# Patient Record
(demographics unavailable — no encounter records)

---

## 2024-11-15 NOTE — PHYSICAL EXAM
[Chaperone Present] : A chaperone was present in the examining room during all aspects of the physical examination [39621] : A chaperone was present during the pelvic exam. [Labia Majora] : normal [Labia Minora] : normal [Normal] : normal [Uterine Adnexae] : normal [FreeTextEntry2] : Linette FNP-BC [FreeTextEntry6] : left adnexal fullness

## 2024-11-15 NOTE — PLAN
[FreeTextEntry1] : CBC/Ca 125 obtained Pelvic MRI ordered  discussed in detail treatment for dermoid cyst she would be a great candidate for RASS left cystectomy Risks and benefits discussed in detail. see scanned in image patient to follow up for final decision for surgery pending pre op sono all of her questions regarding procedure were answered she is agreeable with plan   I Anna Sebastian [facility] am scribing for the presence of Dr. Laguerre the following sections HISTORY OF PRESENT ILLNESS, PAST MEDICAL/FAMILY/SOCIAL HISTORY; REVIEW OF SYSTEMS; VITAL SIGNS; PHYSICAL EXAM; DISPOSITION. I personally performed the services described in the documentation, reviewed the documentation recorded by the scribe in my presence and it accurately and completely records my words and actions.

## 2024-12-09 NOTE — PHYSICAL EXAM
[Appropriately responsive] : appropriately responsive [Alert] : alert [Oriented x3] : oriented x3 [Labia Majora] : normal [Labia Minora] : normal [Normal] : normal [Uterine Adnexae] : normal

## 2024-12-10 NOTE — HISTORY OF PRESENT ILLNESS
[FreeTextEntry1] : Patient is a 30 yo female here today for consult for surgery.   MRI done which revealed 4 cm left ovarian solid mass O-RADS 4, intermediate risk. It is not compatible with a dermoid cyst.   NORMAL CBC NORMAL

## 2024-12-23 NOTE — PLAN
[FreeTextEntry1] : plan LESS Left oophorectomy  zpack and oxycodone #4 ordered  Discussed pre op and post op instructions in detail. Vaginal restrictions only for 2 weeks. all of patients questions regarding procedure were answered. consent signed by MD, patient and witness. she is to f/u with me 2 weeks post operatively. she is agreeable with plan.   I Anna Sebastian [facility] am scribing for the presence of Dr. Laguerre the following sections HISTORY OF PRESENT ILLNESS, PAST MEDICAL/FAMILY/SOCIAL HISTORY; REVIEW OF SYSTEMS; VITAL SIGNS; PHYSICAL EXAM; DISPOSITION. I personally performed the services described in the documentation, reviewed the documentation recorded by the scribe in my presence and it accurately and completely records my words and actions.

## 2024-12-23 NOTE — PLAN
[FreeTextEntry1] : plan LESS Left oophorectomy  zpack and oxycodone #4 ordered  Discussed pre op and post op instructions in detail. Vaginal restrictions only for 2 weeks. all of patients questions regarding procedure were answered. consent signed by MD, patient and witness. she is to f/u with me 2 weeks post operatively. she is agreeable with plan.   I Anna Sebastian [facility] am scribing for the presence of Dr. Laguerre the following sections HISTORY OF PRESENT ILLNESS, PAST MEDICAL/FAMILY/SOCIAL HISTORY; REVIEW OF SYSTEMS; VITAL SIGNS; PHYSICAL EXAM; DISPOSITION. I personally performed the services described in the documentation, reviewed the documentation recorded by the scribe in my presence and it accurately and completely records my words and actions.

## 2024-12-23 NOTE — PHYSICAL EXAM
[Chaperone Present] : A chaperone was present in the examining room during all aspects of the physical examination [[id number]] : A chaperone was present during the pelvic exam. [Appropriately responsive] : appropriately responsive [Alert] : alert [No Acute Distress] : no acute distress [No Lymphadenopathy] : no lymphadenopathy [Regular Rate Rhythm] : regular rate rhythm [No Murmurs] : no murmurs [Clear to Auscultation B/L] : clear to auscultation bilaterally [Soft] : soft [Non-tender] : non-tender [Non-distended] : non-distended [No HSM] : No HSM [No Lesions] : no lesions [No Mass] : no mass [Oriented x3] : oriented x3 [Labia Majora] : normal [Labia Minora] : normal [Normal] : normal [Uterine Adnexae] : normal [FreeTextEntry2] : Linette FNP-BC

## 2024-12-23 NOTE — PHYSICAL EXAM
[Chaperone Present] : A chaperone was present in the examining room during all aspects of the physical examination [[id number]] : A chaperone was present during the pelvic exam. [Appropriately responsive] : appropriately responsive [Alert] : alert [No Acute Distress] : no acute distress [No Lymphadenopathy] : no lymphadenopathy [Regular Rate Rhythm] : regular rate rhythm [No Murmurs] : no murmurs [Clear to Auscultation B/L] : clear to auscultation bilaterally [Soft] : soft [Non-tender] : non-tender [Non-distended] : non-distended [No HSM] : No HSM [No Lesions] : no lesions [No Mass] : no mass [Oriented x3] : oriented x3 [Labia Majora] : normal [Labia Minora] : normal [Normal] : normal [Uterine Adnexae] : normal [FreeTextEntry2] : Linette FNP-BC

## 2025-01-14 NOTE — HISTORY OF PRESENT ILLNESS
[Pain is well-controlled] : pain is well-controlled [Clean/Dry/Intact] : clean, dry and intact [None] : no vaginal bleeding [Normal] : normal [Pathology reviewed] : pathology reviewed [Fever] : no fever [Chills] : no chills [Nausea] : no nausea [Vomiting] : no vomiting [Erythema] : not erythematous [de-identified] : Patient is a 31 yo female here today post op s/p LESS Left oophorectomy 12/27/24. 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

## 2025-01-14 NOTE — PLAN
[None] : None [FreeTextEntry1] : will send to GYN/ONC  Pt to call with heavy bleeding or pain not controlled with NSAIDs Pathology and Surgical photos reviewed today Pt to f/u in 4 weeks for final post op visit. Supportive care measure discussed. All questions answered, pt agreeable with plan.    I Anna Sebastian [facility] am scribing for the presence of Dr. Laguerre the following sections HISTORY OF PRESENT ILLNESS, PAST MEDICAL/FAMILY/SOCIAL HISTORY; REVIEW OF SYSTEMS; VITAL SIGNS; PHYSICAL EXAM; DISPOSITION. I personally performed the services described in the documentation, reviewed the documentation recorded by the scribe in my presence and it accurately and completely records my words and actions.

## 2025-01-14 NOTE — HISTORY OF PRESENT ILLNESS
[Pain is well-controlled] : pain is well-controlled [Clean/Dry/Intact] : clean, dry and intact [None] : no vaginal bleeding [Normal] : normal [Pathology reviewed] : pathology reviewed [Fever] : no fever [Chills] : no chills [Nausea] : no nausea [Vomiting] : no vomiting [Erythema] : not erythematous [de-identified] : Patient is a 31 yo female here today post op s/p LESS Left oophorectomy 12/27/24. 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

## 2025-01-14 NOTE — PLAN
[None] : None [FreeTextEntry1] : will send to GYN/ONC  Pt to call with heavy bleeding or pain not controlled with NSAIDs Pathology and Surgical photos reviewed today Pt to f/u in 4 weeks for final post op visit. Supportive care measure discussed. All questions answered, pt agreeable with plan.    I Anna Sebastian [facility] am scribing for the presence of Dr. Laguerre the following sections HISTORY OF PRESENT ILLNESS, PAST MEDICAL/FAMILY/SOCIAL HISTORY; REVIEW OF SYSTEMS; VITAL SIGNS; PHYSICAL EXAM; DISPOSITION. I personally performed the services described in the documentation, reviewed the documentation recorded by the scribe in my presence and it accurately and completely records my words and actions.

## 2025-01-30 NOTE — ASSESSMENT
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   Diagnosis of borderline tumor is a diagnosis of a precancerous condition with low risk for recurrence. Due to micropapillary features of her tumor, her recurrence rate is less than or equal to 10%. In review of her MRI imaging, her upper abdomen appears normal and therefore the need for additional imaging at this time is not present. I would like to offer surveillance for her but will confirm with our treatment planning board at the upcoming conference on 2/4/25. I will call patient either Tuesday afternoon or Wednesday to advise her of final recommendations. Should we go forward with surveillance, patient will have US in 4 months as well as CA-125. In terms of trying to conceive, patient is interested in trying to plan for a family now, I advised her to hold at least 6 months to ensure right ovary remains normal before TTC, if normal will provide clearance at that time.

## 2025-01-30 NOTE — CHIEF COMPLAINT
[Spouse] : spouse [FreeTextEntry1] : [facility] Physician [facility] [place] [facility] [address] [place] [id number]  Serous borderline tumor

## 2025-01-30 NOTE — HISTORY OF PRESENT ILLNESS
[FreeTextEntry1] : 31 yo G0 LMP 1/20/25 referred by Dr Laguerre for left ovarian micropapillary serous borderline tumor.   Patient is an US and began feeling LLQ pain, she performed US for herself and saw a mass on her left ovary. She ultimately followed up with her gynecologist at [facility] who did an US in June and was advised to follow up in 4 months. At her 4 month follow up she states cyst had not changed but surgical consultation was an option as well as observation. Patient scheduled consultation with surgeon but felt uneasy about this and ultimately saw Dr Laguerre 11/15/24 who ordered tumor makers and MRI.    11/15/24 (18) MR 11/22/24 4 cm left ovarian solid mass O-RADS 4, intermediate risk. It is not compatible with a dermoid cyst. No lymphadenopathy or ascites.  US 12/20/24 3.1 x 3.2 x 3.3 cm solid vascular mass on the left ovary.   Patient s/p EUA laparoendoscopic single site left salpingo-oophorectomy 12/27/24, pathology resulted as Left adnexa -ovary with micropapillary serous borderline tumor, tumor shows abundant confluent growth and some pathologist may classify this as non-invasive micropapillary low grade serous carcinoma. Since the tumor is non-invasive it is best classified as micropapillary serous borderline tumor and does not represent an invasive carcinoma. [facility] stains show the tumor cells to be positive for WT1 and ER while negative for GATA3 and TTF1. PW-negative for malignant cysts, cystospin preparation shows rare benign mesothelial cells.   Lpap: 10/2024 WNL per pt never abnormal

## 2025-01-30 NOTE — PLAN
[TextEntry] : CA-125 and US in 4 months Will present at [facility], I will call after to discuss consensus Hold TTC for 6 months

## 2025-01-30 NOTE — END OF VISIT
[FreeTextEntry3] : I, Dr. Susy Banda, personally performed the evaluation and management of (E/M) services for this new patient. That E/M includes conducting the initial examination, assessing all conditions, and establishing the plan of care. Today, Angelina Zamorano PA-C, was here to observe my evaluation and management services for this patient to be followed going forward.

## 2025-02-11 NOTE — REASON FOR VISIT
[Post Op Day: ___] : Post-Op Day:  #[unfilled] Photo Preface (Leave Blank If You Do Not Want): Photographs were obtained today for monitoring. Detail Level: Simple

## 2025-02-11 NOTE — HISTORY OF PRESENT ILLNESS
[Pain is well-controlled] : pain is well-controlled [Fever] : no fever [Chills] : no chills [Nausea] : no nausea [Vomiting] : no vomiting [Clean/Dry/Intact] : clean, dry and intact [Erythema] : not erythematous [None] : no vaginal bleeding [Normal] : normal [Pathology reviewed] : pathology reviewed [de-identified] : Patient is a 29 yo female here today post op s/p LESS Left oophorectomy 12/27/24. 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

## 2025-02-11 NOTE — PLAN
[None] : None [FreeTextEntry1] : was seen by GYN/ONC who recommend surveillance every 4 months with TVS and ca-125 was advised to wait 6 months prior to TTC.  if cleared by GYN ONC in 06/2025. she is to let us know and will send PNV. annual due 07/2025 Pt to call with heavy bleeding or pain not controlled with NSAIDs Supportive care measure discussed. All questions answered, pt agreeable with plan.

## 2025-02-25 NOTE — CHIEF COMPLAINT
[Urgent Visit] : Urgent Visit [FreeTextEntry1] : c/o vaginal itch and discharge. she states she feels like her symptoms never resolved from previous yeast/BV infection. s/p LESS Left oophorectomy 12/27/24

## 2025-02-25 NOTE — DISCUSSION/SUMMARY
[FreeTextEntry1] : vaginitis plus ordered - will call with results.  will treat with diflucan x 2 tabs  if she continues to have symptoms, she is to let us know- will consider suppressive therapy with weekly diflucan. whiff test negative, pH - acidic. exam consistent with yeast.

## 2025-02-25 NOTE — PHYSICAL EXAM
[Normal] : uterus [No Bleeding] : there was no active vaginal bleeding [Discharge] : had a ~M discharge [Scant] : scant [White] : white [Uterine Adnexae] : were not tender and not enlarged

## 2025-03-27 NOTE — CHIEF COMPLAINT
[Urgent Visit] : Urgent Visit [FreeTextEntry1] : Patient is a 31 yo FEMALE here today for vaginal irritation and itching. denies any obvious discharge. she also complains of LLQ pelvic pain, hx of Less left oopherectomy 12/2024 Pathology revealed serous papillary borderline ovarian cancer.

## 2025-03-27 NOTE — PHYSICAL EXAM
[Normal] : uterus [Discharge] : a  ~M vaginal discharge was present [Scant] : scant [White] : white [No Bleeding] : there was no active vaginal bleeding [Adnexa Absent] : was absent on the left

## 2025-03-27 NOTE — DISCUSSION/SUMMARY
[FreeTextEntry1] : Rx for diflucan 150mg given for yeast infection.  vaginal culture sent will follow up with results Rx for TVS given will follow up with results. likely bowel pain. pain is intermittent and crampy.  or could be from scar tissue follow up with GYN ONC. planning pregnancy in near future.

## 2025-05-30 NOTE — END OF VISIT
[FreeTextEntry3] : Written by Ariadne Dhillon, acting as a scribe for Dr. Susy Banda. This note accurately reflects the work and decisions made by me.

## 2025-05-30 NOTE — PLAN
[TextEntry] :  Follow up PMD as needed for ongoing medical issues   Imaging as clinically indicated  The risk of recurrence, signs and symptoms  and surveillance plan were reviewed in detail.  Return in 4 months or PRN if symptoms arise. All questions were answered to her apparent satisfaction.

## 2025-05-30 NOTE — REASON FOR VISIT
[FreeTextEntry1] : [facility] [address] [place] [address] [phone]

## 2025-05-30 NOTE — ASSESSMENT
[FreeTextEntry1] : The patients most recent ultrasound is very reassuring. As well as normal menstrual cycles and reports interest in future fertility. On clinical evaluation, there are no concerning findings noted on pelvic examination. The uterus is normal in size, and there is no evidence of adnexal masses or tenderness.  Given the presence of only one ovary, it was discussed that while conceiving may potentially take slightly longer, this should not significantly impact overall fertility potential. At this time, there is no indication for fertility preservation, such as egg freezing, as ovarian function appears to be adequate and there is no immediate threat to future fertility.  The patient was counseled that she may begin attempting to conceive as desired.

## 2025-05-30 NOTE — PHYSICAL EXAM
[MA] : MA [Normal] : Adnexa(ae): Normal [FreeTextEntry2] : Ariadne Dhillon [de-identified] : antiverted uterus

## 2025-05-30 NOTE — HISTORY OF PRESENT ILLNESS
[FreeTextEntry1] : 29 yo presents today for left ovarian micropapillary serous borderline tumor. She  returns for interval surveillance offering no new complaints including no abdominopelvic pain, vaginal or rectal bleeding, chest pain or shortness of breath. Normal bowel and bladder function.  TVUS 5/22/2025 IMPRESSION: Questionable small endometrial polyp. If clinically warranted, a sonohysterogram may be performed for confirmation. Small right ovarian cyst with thin septation. Status post left oophorectomy. No left adnexal mass is seen.   11/15/24 (18) MR 11/22/24 4 cm left ovarian solid mass O-RADS 4, intermediate risk. It is not compatible with a dermoid cyst. No lymphadenopathy or ascites. US 12/20/24 3.1 x 3.2 x 3.3 cm solid vascular mass on the left ovary.  Patient s/p EUA laparoendoscopic single site left salpingo-oophorectomy 12/27/24, pathology resulted as Left adnexa -ovary with micropapillary serous borderline tumor, tumor shows abundant confluent growth and some pathologist may classify this as non-invasive micropapillary low grade serous carcinoma. Since the tumor is non-invasive it is best classified as micropapillary serous borderline tumor and does not represent an invasive carcinoma. [facility] stains show the tumor cells to be positive for WT1 and ER while negative for GATA3 and TTF1. PW-negative for malignant cysts, cystospin preparation shows rare benign mesothelial cells.  Lpap: 10/2024 WNL per pt never abnormal

## 2025-05-30 NOTE — REASON FOR VISIT
[FreeTextEntry1] : [facility] [address] [place] [address] [phone]

## 2025-05-30 NOTE — PHYSICAL EXAM
[MA] : MA [Normal] : Adnexa(ae): Normal [FreeTextEntry2] : Ariadne Dhillon [de-identified] : antiverted uterus

## 2025-05-30 NOTE — HISTORY OF PRESENT ILLNESS
[FreeTextEntry1] : 29 yo presents today for left ovarian micropapillary serous borderline tumor. She  returns for interval surveillance offering no new complaints including no abdominopelvic pain, vaginal or rectal bleeding, chest pain or shortness of breath. Normal bowel and bladder function.  TVUS 5/22/2025 IMPRESSION: Questionable small endometrial polyp. If clinically warranted, a sonohysterogram may be performed for confirmation. Small right ovarian cyst with thin septation. Status post left oophorectomy. No left adnexal mass is seen.   11/15/24 (18) MR 11/22/24 4 cm left ovarian solid mass O-RADS 4, intermediate risk. It is not compatible with a dermoid cyst. No lymphadenopathy or ascites. US 12/20/24 3.1 x 3.2 x 3.3 cm solid vascular mass on the left ovary.  Patient s/p EUA laparoendoscopic single site left salpingo-oophorectomy 12/27/24, pathology resulted as Left adnexa -ovary with micropapillary serous borderline tumor, tumor shows abundant confluent growth and some pathologist may classify this as non-invasive micropapillary low grade serous carcinoma. Since the tumor is non-invasive it is best classified as micropapillary serous borderline tumor and does not represent an invasive carcinoma. [facility] stains show the tumor cells to be positive for WT1 and ER while negative for GATA3 and TTF1. PW-negative for malignant cysts, cystospin preparation shows rare benign mesothelial cells.  Lpap: 10/2024 WNL per pt never abnormal